# Patient Record
Sex: FEMALE | Race: WHITE
[De-identification: names, ages, dates, MRNs, and addresses within clinical notes are randomized per-mention and may not be internally consistent; named-entity substitution may affect disease eponyms.]

---

## 2018-12-17 ENCOUNTER — HOSPITAL ENCOUNTER (OUTPATIENT)
Dept: HOSPITAL 53 - M RAD | Age: 33
End: 2018-12-17
Attending: OTOLARYNGOLOGY
Payer: COMMERCIAL

## 2018-12-17 DIAGNOSIS — J32.4: Primary | ICD-10-CM

## 2018-12-17 NOTE — REP
MAXILLOFACIAL CT WITHOUT CONTRAST:

 

HISTORY: Chronic pansinusitis.

 

The sinuses are clear. The osteomeatal units are patent. The middle and inferior

nasal turbinates are partially paradoxical. There is gisella bullosa of the middle

nasal turbinates. There is minimal deviation of the nasal septum to the right. A

spur is present arising from the right side of the nasal septum. The cribriform

plate, medial walls of the orbits and optic canals are intact. The carotid canals

form a segment of the posterolateral walls of the sphenoid sinus.

 

IMPRESSION:There is no acute or chronic sinusitis.

 

 

Electronically Signed by

Berlin Esparza MD 12/17/2018 04:28 P

## 2019-01-09 ENCOUNTER — HOSPITAL ENCOUNTER (OUTPATIENT)
Dept: HOSPITAL 53 - M SMT | Age: 34
End: 2019-01-09
Attending: ALLERGY & IMMUNOLOGY
Payer: COMMERCIAL

## 2019-01-09 DIAGNOSIS — L50.3: Primary | ICD-10-CM

## 2019-01-09 LAB
ALBUMIN SERPL BCG-MCNC: 3.9 GM/DL (ref 3.2–5.2)
ALT SERPL W P-5'-P-CCNC: 23 U/L (ref 12–78)
BASOPHILS # BLD AUTO: 0.1 10^3/UL (ref 0–0.2)
BASOPHILS NFR BLD AUTO: 0.8 % (ref 0–1)
BILIRUB SERPL-MCNC: 0.3 MG/DL (ref 0.2–1)
BUN SERPL-MCNC: 11 MG/DL (ref 7–18)
CALCIUM SERPL-MCNC: 8.3 MG/DL (ref 8.5–10.1)
CHLORIDE SERPL-SCNC: 106 MEQ/L (ref 98–107)
CO2 SERPL-SCNC: 28 MEQ/L (ref 21–32)
CREAT SERPL-MCNC: 0.82 MG/DL (ref 0.55–1.3)
EOSINOPHIL # BLD AUTO: 0.2 10^3/UL (ref 0–0.5)
EOSINOPHIL NFR BLD AUTO: 2.5 % (ref 0–3)
GFR SERPL CREATININE-BSD FRML MDRD: > 60 ML/MIN/{1.73_M2} (ref 60–?)
GLUCOSE SERPL-MCNC: 87 MG/DL (ref 70–100)
HCT VFR BLD AUTO: 42.1 % (ref 36–47)
HGB BLD-MCNC: 14.4 G/DL (ref 12–15.5)
LYMPHOCYTES # BLD AUTO: 1.9 10^3/UL (ref 1.5–4.5)
LYMPHOCYTES NFR BLD AUTO: 29.3 % (ref 24–44)
MCH RBC QN AUTO: 31.9 PG (ref 27–33)
MCHC RBC AUTO-ENTMCNC: 34.2 G/DL (ref 32–36.5)
MCV RBC AUTO: 93.1 FL (ref 80–96)
MONOCYTES # BLD AUTO: 0.6 10^3/UL (ref 0–0.8)
MONOCYTES NFR BLD AUTO: 9 % (ref 0–5)
NEUTROPHILS # BLD AUTO: 3.7 10^3/UL (ref 1.8–7.7)
NEUTROPHILS NFR BLD AUTO: 58.1 % (ref 36–66)
PLATELET # BLD AUTO: 297 10^3/UL (ref 150–450)
POTASSIUM SERPL-SCNC: 4.4 MEQ/L (ref 3.5–5.1)
PROT SERPL-MCNC: 7.7 GM/DL (ref 6.4–8.2)
RBC # BLD AUTO: 4.52 10^6/UL (ref 4–5.4)
RHEUMATOID FACT SERPL-ACNC: < 10 IU/ML (ref ?–15)
SODIUM SERPL-SCNC: 140 MEQ/L (ref 136–145)
T3 SERPL-MCNC: 102.4 NG/DL (ref 60–181)
T4 SERPL-MCNC: 10.4 UG/DL (ref 4.5–12)
THYROGLOB AB SERPL-ACNC: < 15 U/ML (ref ?–60)
THYROPEROXIDASE AB SERPL IA-ACNC: 29.9 U/ML (ref ?–60)
TSH SERPL DL<=0.005 MIU/L-ACNC: 0.93 UIU/ML (ref 0.36–3.74)
WBC # BLD AUTO: 6.4 10^3/UL (ref 4–10)

## 2019-01-18 LAB — FC EPSILON RI AB SER QL: <2.4 (ref ?–10)
